# Patient Record
Sex: FEMALE | Race: WHITE | Employment: UNEMPLOYED | URBAN - METROPOLITAN AREA
[De-identification: names, ages, dates, MRNs, and addresses within clinical notes are randomized per-mention and may not be internally consistent; named-entity substitution may affect disease eponyms.]

---

## 2017-08-07 ENCOUNTER — HOSPITAL ENCOUNTER (INPATIENT)
Age: 52
LOS: 1 days | Discharge: HOME | DRG: 885 | End: 2017-08-08
Attending: EMERGENCY MEDICINE | Admitting: PSYCHIATRY & NEUROLOGY
Payer: MEDICARE

## 2017-08-07 ENCOUNTER — APPOINTMENT (OUTPATIENT)
Dept: GENERAL RADIOLOGY | Age: 52
DRG: 885 | End: 2017-08-07
Attending: PHYSICIAN ASSISTANT
Payer: MEDICARE

## 2017-08-07 DIAGNOSIS — R45.851 SUICIDAL IDEATION: Primary | ICD-10-CM

## 2017-08-07 DIAGNOSIS — F25.9 SCHIZOAFFECTIVE DISORDER, UNSPECIFIED TYPE (HCC): ICD-10-CM

## 2017-08-07 DIAGNOSIS — S63.502A WRIST SPRAIN, LEFT, INITIAL ENCOUNTER: ICD-10-CM

## 2017-08-07 PROBLEM — F20.9 SCHIZOPHRENIA (HCC): Status: ACTIVE | Noted: 2017-08-07

## 2017-08-07 LAB
ALBUMIN SERPL BCP-MCNC: 3.2 G/DL (ref 3.5–5)
ALBUMIN/GLOB SERPL: 0.9 {RATIO} (ref 1.1–2.2)
ALP SERPL-CCNC: 154 U/L (ref 45–117)
ALT SERPL-CCNC: 20 U/L (ref 12–78)
AMPHET UR QL SCN: NEGATIVE
ANION GAP BLD CALC-SCNC: 12 MMOL/L (ref 5–15)
AST SERPL W P-5'-P-CCNC: 17 U/L (ref 15–37)
BARBITURATES UR QL SCN: NEGATIVE
BASOPHILS # BLD AUTO: 0 K/UL (ref 0–0.1)
BASOPHILS # BLD: 0 % (ref 0–1)
BENZODIAZ UR QL: NEGATIVE
BILIRUB SERPL-MCNC: 0.2 MG/DL (ref 0.2–1)
BUN SERPL-MCNC: 11 MG/DL (ref 6–20)
BUN/CREAT SERPL: 14 (ref 12–20)
CALCIUM SERPL-MCNC: 8.8 MG/DL (ref 8.5–10.1)
CANNABINOIDS UR QL SCN: NEGATIVE
CHLORIDE SERPL-SCNC: 104 MMOL/L (ref 97–108)
CO2 SERPL-SCNC: 26 MMOL/L (ref 21–32)
COCAINE UR QL SCN: NEGATIVE
CREAT SERPL-MCNC: 0.79 MG/DL (ref 0.55–1.02)
DRUG SCRN COMMENT,DRGCM: NORMAL
EOSINOPHIL # BLD: 0.1 K/UL (ref 0–0.4)
EOSINOPHIL NFR BLD: 1 % (ref 0–7)
ERYTHROCYTE [DISTWIDTH] IN BLOOD BY AUTOMATED COUNT: 12.9 % (ref 11.5–14.5)
ETHANOL SERPL-MCNC: <10 MG/DL
GLOBULIN SER CALC-MCNC: 3.7 G/DL (ref 2–4)
GLUCOSE SERPL-MCNC: 132 MG/DL (ref 65–100)
HCG UR QL: NEGATIVE
HCT VFR BLD AUTO: 36.9 % (ref 35–47)
HGB BLD-MCNC: 13 G/DL (ref 11.5–16)
LYMPHOCYTES # BLD AUTO: 20 % (ref 12–49)
LYMPHOCYTES # BLD: 2.3 K/UL (ref 0.8–3.5)
MCH RBC QN AUTO: 30.8 PG (ref 26–34)
MCHC RBC AUTO-ENTMCNC: 35.2 G/DL (ref 30–36.5)
MCV RBC AUTO: 87.4 FL (ref 80–99)
METHADONE UR QL: NEGATIVE
MONOCYTES # BLD: 0.6 K/UL (ref 0–1)
MONOCYTES NFR BLD AUTO: 5 % (ref 5–13)
NEUTS SEG # BLD: 8.3 K/UL (ref 1.8–8)
NEUTS SEG NFR BLD AUTO: 74 % (ref 32–75)
OPIATES UR QL: NEGATIVE
PCP UR QL: NEGATIVE
PLATELET # BLD AUTO: 284 K/UL (ref 150–400)
POTASSIUM SERPL-SCNC: 3.3 MMOL/L (ref 3.5–5.1)
PROT SERPL-MCNC: 6.9 G/DL (ref 6.4–8.2)
RBC # BLD AUTO: 4.22 M/UL (ref 3.8–5.2)
SODIUM SERPL-SCNC: 142 MMOL/L (ref 136–145)
WBC # BLD AUTO: 11.3 K/UL (ref 3.6–11)

## 2017-08-07 PROCEDURE — 73110 X-RAY EXAM OF WRIST: CPT

## 2017-08-07 PROCEDURE — 80307 DRUG TEST PRSMV CHEM ANLYZR: CPT | Performed by: EMERGENCY MEDICINE

## 2017-08-07 PROCEDURE — 65220000003 HC RM SEMIPRIVATE PSYCH

## 2017-08-07 PROCEDURE — 74011250637 HC RX REV CODE- 250/637: Performed by: PHYSICIAN ASSISTANT

## 2017-08-07 PROCEDURE — 81025 URINE PREGNANCY TEST: CPT

## 2017-08-07 PROCEDURE — 36415 COLL VENOUS BLD VENIPUNCTURE: CPT | Performed by: EMERGENCY MEDICINE

## 2017-08-07 PROCEDURE — 74011250637 HC RX REV CODE- 250/637: Performed by: PSYCHIATRY & NEUROLOGY

## 2017-08-07 PROCEDURE — 80053 COMPREHEN METABOLIC PANEL: CPT | Performed by: EMERGENCY MEDICINE

## 2017-08-07 PROCEDURE — 90791 PSYCH DIAGNOSTIC EVALUATION: CPT

## 2017-08-07 PROCEDURE — 85025 COMPLETE CBC W/AUTO DIFF WBC: CPT | Performed by: EMERGENCY MEDICINE

## 2017-08-07 PROCEDURE — 99285 EMERGENCY DEPT VISIT HI MDM: CPT

## 2017-08-07 RX ORDER — BENZTROPINE MESYLATE 1 MG/ML
2 INJECTION INTRAMUSCULAR; INTRAVENOUS
Status: DISCONTINUED | OUTPATIENT
Start: 2017-08-07 | End: 2017-08-08 | Stop reason: HOSPADM

## 2017-08-07 RX ORDER — ADHESIVE BANDAGE
30 BANDAGE TOPICAL DAILY PRN
Status: DISCONTINUED | OUTPATIENT
Start: 2017-08-07 | End: 2017-08-08 | Stop reason: HOSPADM

## 2017-08-07 RX ORDER — IBUPROFEN 400 MG/1
400 TABLET ORAL
Status: DISCONTINUED | OUTPATIENT
Start: 2017-08-07 | End: 2017-08-08 | Stop reason: HOSPADM

## 2017-08-07 RX ORDER — BENZTROPINE MESYLATE 2 MG/1
2 TABLET ORAL
Status: DISCONTINUED | OUTPATIENT
Start: 2017-08-07 | End: 2017-08-08 | Stop reason: HOSPADM

## 2017-08-07 RX ORDER — LORAZEPAM 2 MG/ML
2 INJECTION INTRAMUSCULAR
Status: DISCONTINUED | OUTPATIENT
Start: 2017-08-07 | End: 2017-08-08 | Stop reason: HOSPADM

## 2017-08-07 RX ORDER — OLANZAPINE 5 MG/1
5 TABLET ORAL
Status: DISCONTINUED | OUTPATIENT
Start: 2017-08-07 | End: 2017-08-08 | Stop reason: HOSPADM

## 2017-08-07 RX ORDER — ZOLPIDEM TARTRATE 10 MG/1
10 TABLET ORAL
Status: DISCONTINUED | OUTPATIENT
Start: 2017-08-07 | End: 2017-08-08 | Stop reason: HOSPADM

## 2017-08-07 RX ORDER — ACETAMINOPHEN 325 MG/1
650 TABLET ORAL
Status: DISCONTINUED | OUTPATIENT
Start: 2017-08-07 | End: 2017-08-08 | Stop reason: HOSPADM

## 2017-08-07 RX ORDER — IBUPROFEN 200 MG
1 TABLET ORAL
Status: DISCONTINUED | OUTPATIENT
Start: 2017-08-07 | End: 2017-08-08 | Stop reason: HOSPADM

## 2017-08-07 RX ORDER — POTASSIUM CHLORIDE 750 MG/1
20 TABLET, FILM COATED, EXTENDED RELEASE ORAL
Status: COMPLETED | OUTPATIENT
Start: 2017-08-07 | End: 2017-08-07

## 2017-08-07 RX ORDER — LORAZEPAM 1 MG/1
1 TABLET ORAL
Status: DISCONTINUED | OUTPATIENT
Start: 2017-08-07 | End: 2017-08-08 | Stop reason: HOSPADM

## 2017-08-07 RX ADMIN — LORAZEPAM 1 MG: 1 TABLET ORAL at 22:27

## 2017-08-07 RX ADMIN — IBUPROFEN 400 MG: 400 TABLET, FILM COATED ORAL at 21:49

## 2017-08-07 RX ADMIN — POTASSIUM CHLORIDE 20 MEQ: 750 TABLET, FILM COATED, EXTENDED RELEASE ORAL at 18:30

## 2017-08-07 RX ADMIN — OLANZAPINE 5 MG: 5 TABLET, FILM COATED ORAL at 22:27
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
## 2017-08-07 NOTE — IP AVS SNAPSHOT
[facility] 
 
 
 [address] Patient: Laura High MRN: [id number] :1965 You are allergic to the following Allergen Reactions Codeine Swelling Penicillins Swelling Flexeril (Cyclobenzaprine) Other (comments)  
 numbness Mushroom Combination No.1 Nausea and Vomiting Mustard Diarrhea Nausea and Vomiting Neurontin (Gabapentin) Other (comments) \"I get real sleepy and I fall\" Peppermint Diarrhea Nausea and Vomiting Pt states \"pepper\" Trazodone Other (comments) \"I get sleepy and I fall\" Recent Documentation Height Weight BMI OB Status Smoking Status 1.549 m 102.1 kg 42.51 kg/m2 Premenopausal Former Smoker Unresulted Labs Order Current Status CULTURE, MRSA In process Emergency Contacts Name Discharge Info Relation Home Work Mobile Brandi Baird CAREGIVER [3] Friend [5] [phone] About your hospitalization You were admitted on:  2017 You last received care in the:  [address] [facility] [address] [address] You were discharged on:  2017 Unit phone number:  [phone] Why you were hospitalized Your primary diagnosis was:  Schizoaffective Disorder, Mixed Type (Hcc) Your diagnoses also included:  Malingering, Essential Hypertension, Acquired Hypothyroidism, Morbid Obesity (Hcc), Borderline Personality Disorder Providers Seen During Your Hospitalizations Provider Role Specialty Primary office phone Bart Simental MD Attending Provider Emergency Medicine [phone] Alexandre Ernandez MD Attending Provider Psychiatry [phone] Yovani James MD Attending Provider Psychiatry [phone] Your Primary Care Physician (PCP) Primary Care Physician Office Phone Office Fax Florinda Record [phone] Follow-up Information Follow up With Details Comments Contact Info Elsie Ravi MD   [address] [facility] 
[phone] [facility] On 9/13/2017 4:00 PM for medication management  [address] [facility] [address] 
  
Dr. Rashaad Jenkins- psychiatry appointment on September 13th at 4:00PM  
 [facility] On 8/11/2017 2:00 PM for case management  [address] [facility] [address] [facility] manager will come to [facility]. Current Discharge Medication List  
  
CONTINUE these medications which have NOT CHANGED Dose & Instructions Dispensing Information Comments Morning Noon Evening Bedtime  
 albuterol 90 mcg/actuation inhaler Commonly known as:  PROVENTIL HFA, VENTOLIN HFA, PROAIR HFA Your last dose was: Your next dose is:    
   
   
 Dose:  1-2 Puff Take 1-2 Puffs by inhalation every four (4) hours as needed for Wheezing. Refills:  0  
     
   
   
   
  
 allopurinol 100 mg tablet Commonly known as:  Carolynne Flavors Your last dose was: Your next dose is:    
   
   
 Dose:  200 mg Take 200 mg by mouth daily. Refills:  0  
     
   
   
   
  
 atorvastatin 40 mg tablet Commonly known as:  LIPITOR Your last dose was: Your next dose is:    
   
   
 Dose:  40 mg Take 40 mg by mouth nightly. Refills:  0 FLUoxetine 20 mg capsule Commonly known as:  PROzac Your last dose was: Your next dose is:    
   
   
 Dose:  20 mg Take 20 mg by mouth daily. Refills:  0 KEPPRA 750 mg tablet Generic drug:  levETIRAcetam  
   
Your last dose was: Your next dose is:    
   
   
 Dose:  750 mg Take 750 mg by mouth two (2) times a day. Refills:  0  
     
   
   
   
  
 levothyroxine 50 mcg tablet Commonly known as:  SYNTHROID Your last dose was: Your next dose is:    
   
   
 Dose:  50 mcg Take 1 Tab by mouth Daily (before breakfast). Quantity:  30 Tab Refills:  0  
     
   
   
   
  
 omeprazole 20 mg capsule Commonly known as:  PRILOSEC Your last dose was: Your next dose is:    
   
   
 Dose:  20 mg Take 20 mg by mouth nightly. Refills:  0  
     
   
   
   
  
 oxybutynin chloride XL 5 mg CR tablet Commonly known as:  DITROPAN XL Your last dose was: Your next dose is:    
   
   
 Dose:  5 mg Take 5 mg by mouth two (2) times a day. Refills:  0 phenytoin  mg ER capsule Commonly known as:  DILANTIN ER Your last dose was: Your next dose is:    
   
   
 Dose:  200 mg Take 200 mg by mouth two (2) times a day. Refills:  0  
     
   
   
   
  
 prazosin 1 mg capsule Commonly known as:  MINIPRESS Your last dose was: Your next dose is:    
   
   
 Dose:  2 mg Take 2 mg by mouth nightly. Refills:  0  
     
   
   
   
  
 * QUEtiapine 100 mg tablet Commonly known as:  SEROquel Your last dose was: Your next dose is:    
   
   
 Dose:  100 mg Take 100 mg by mouth every morning. Refills:  0  
     
   
   
   
  
 * QUEtiapine 100 mg tablet Commonly known as:  SEROquel Your last dose was: Your next dose is:    
   
   
 Dose:  200 mg Take 200 mg by mouth nightly. Refills:  0  
     
   
   
   
  
 * Notice: This list has 2 medication(s) that are the same as other medications prescribed for you. Read the directions carefully, and ask your doctor or other care provider to review them with you. Discharge Instructions DISCHARGE SUMMARY from Nurse The following personal items are in your possession at time of discharge: 
Visual Aid: None Clothing: Lorraine Blanca Other Valuables: Cigarettes, Lighter/matches, Wallet, Other (comment) (book, drinking bottle, and bra) PATIENT INSTRUCTIONS: 
What to do at Home: 
Recommended activity: Activity as tolerated *  Please give a list of your current medications to your Primary Care Provider. *  Please update this list whenever your medications are discontinued, doses are 
    changed, or new medications (including over-the-counter products) are added. *  Please carry medication information at all times in case of emergency situations. These are general instructions for a healthy lifestyle: No smoking/ No tobacco products/ Avoid exposure to second hand smoke Surgeon General's Warning:  Quitting smoking now greatly reduces serious risk to your health. Obesity, smoking, and sedentary lifestyle greatly increases your risk for illness A healthy diet, regular physical exercise & weight monitoring are important for maintaining a healthy lifestyle The discharge information has been reviewed with the patient. The patient verbalized understanding. Discharge medications reviewed with the patient and appropriate educational materials and side effects teaching were provided. .If I feel I am at risk of hurting myself or others, I will call the crisis office and speak with a crisis worker who will assist me during my crisis. [facility]  [phone] [facility] [phone] [place] [address]  [phone] [employer] 16-   [phone] [employer]-  [phone] 9 [facility]  [phone] [address] [place] [address]  [phone] Discharge Orders None [facility] Announcement We are excited to announce that we are making your provider's discharge notes available to you in [employer]. You will see these notes when they are completed and signed by the physician that discharged you from your recent hospital stay.   If you have any questions or concerns about any information you see in [employer], please call the Health Information Department where you were seen or reach out to your Primary Care Provider for more information about your plan of care. Introducing [facility]! Franny Toribio introduces [employer] patient portal. Now you can access parts of your medical record, email your doctor's office, and request medication refills online. 1. In your internet browser, go to [email or web address] 2. Click on the First Time User? Click Here link in the Sign In box. You will see the New Member Sign Up page. 3. Enter your [employer] Access Code exactly as it appears below. You will not need to use this code after youve completed the sign-up process. If you do not sign up before the expiration date, you must request a new code. · [employer] Access Code: [email or web address] Expires: 11/6/2017  1:07 PM 
 
4. Enter the last four digits of your Social Security Number (xxxx) and Date of Birth (mm/dd/yyyy) as indicated and click Submit. You will be taken to the next sign-up page. 5. Create a [employer] ID. This will be your [employer] login ID and cannot be changed, so think of one that is secure and easy to remember. 6. Create a [employer] password. You can change your password at any time. 7. Enter your Password Reset Question and Answer. This can be used at a later time if you forget your password. 8. Enter your e-mail address. You will receive e-mail notification when new information is available in [address] 9. Click Sign Up. You can now view and download portions of your medical record. 10. Click the Download Summary menu link to download a portable copy of your medical information. If you have questions, please visit the Frequently Asked Questions section of the [employer] website. Remember, [employer] is NOT to be used for urgent needs. For medical emergencies, dial 911. Now available from your iPhone and Android! General Information Please provide this summary of care documentation to your next provider. Patient Signature:  ____________________________________________________________ Date:  ____________________________________________________________  
  
Kym Oka Provider Signature:  ____________________________________________________________ Date:  ____________________________________________________________

## 2017-08-07 NOTE — ED NOTES
Bedside and Verbal shift change report given to Mickey Baron RN (oncoming nurse) by Chaparrita Cook RN (offgoing nurse). Report included the following information SBAR, ED Summary, MAR and Recent Results. Assumed pt care at this time. Pt noted to be resting comfortably. Pt updated on plan of care, has no questions or concerns at this time.
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
## 2017-08-07 NOTE — IP AVS SNAPSHOT
[address] [facility], [place] 
 
 
 [address] Patient: Khadijah Duran MRN: [id number] :1965 Current Discharge Medication List  
  
CONTINUE these medications which have NOT CHANGED Dose & Instructions Dispensing Information Comments Morning Noon Evening Bedtime  
 albuterol 90 mcg/actuation inhaler Commonly known as:  PROVENTIL HFA, VENTOLIN HFA, PROAIR HFA Your last dose was: Your next dose is:    
   
   
 Dose:  1-2 Puff Take 1-2 Puffs by inhalation every four (4) hours as needed for Wheezing. Refills:  0  
     
   
   
   
  
 allopurinol 100 mg tablet Commonly known as:  Amee Fulling Your last dose was: Your next dose is:    
   
   
 Dose:  200 mg Take 200 mg by mouth daily. Refills:  0  
     
   
   
   
  
 atorvastatin 40 mg tablet Commonly known as:  LIPITOR Your last dose was: Your next dose is:    
   
   
 Dose:  40 mg Take 40 mg by mouth nightly. Refills:  0 FLUoxetine 20 mg capsule Commonly known as:  PROzac Your last dose was: Your next dose is:    
   
   
 Dose:  20 mg Take 20 mg by mouth daily. Refills:  0 KEPPRA 750 mg tablet Generic drug:  levETIRAcetam  
   
Your last dose was: Your next dose is:    
   
   
 Dose:  750 mg Take 750 mg by mouth two (2) times a day. Refills:  0  
     
   
   
   
  
 levothyroxine 50 mcg tablet Commonly known as:  SYNTHROID Your last dose was: Your next dose is:    
   
   
 Dose:  50 mcg Take 1 Tab by mouth Daily (before breakfast). Quantity:  30 Tab Refills:  0  
     
   
   
   
  
 omeprazole 20 mg capsule Commonly known as:  PRILOSEC Your last dose was: Your next dose is:    
   
   
 Dose:  20 mg Take 20 mg by mouth nightly. Refills:  0 oxybutynin chloride XL 5 mg CR tablet Commonly known as:  DITROPAN XL Your last dose was: Your next dose is:    
   
   
 Dose:  5 mg Take 5 mg by mouth two (2) times a day. Refills:  0 phenytoin  mg ER capsule Commonly known as:  DILANTIN ER Your last dose was: Your next dose is:    
   
   
 Dose:  200 mg Take 200 mg by mouth two (2) times a day. Refills:  0  
     
   
   
   
  
 prazosin 1 mg capsule Commonly known as:  MINIPRESS Your last dose was: Your next dose is:    
   
   
 Dose:  2 mg Take 2 mg by mouth nightly. Refills:  0  
     
   
   
   
  
 * QUEtiapine 100 mg tablet Commonly known as:  SEROquel Your last dose was: Your next dose is:    
   
   
 Dose:  100 mg Take 100 mg by mouth every morning. Refills:  0  
     
   
   
   
  
 * QUEtiapine 100 mg tablet Commonly known as:  SEROquel Your last dose was: Your next dose is:    
   
   
 Dose:  200 mg Take 200 mg by mouth nightly. Refills:  0  
     
   
   
   
  
 * Notice: This list has 2 medication(s) that are the same as other medications prescribed for you. Read the directions carefully, and ask your doctor or other care provider to review them with you.

## 2017-08-07 NOTE — ROUTINE PROCESS
TRANSFER - OUT REPORT:    Verbal report given to Igor RN(name) on [facility]  being transferred to U(unit) for routine progression of care       Report consisted of patients Situation, Background, Assessment and   Recommendations(SBAR). Information from the following report(s) SBAR, ED Summary, [facility] and Recent Results was reviewed with the receiving nurse. Lines:       Opportunity for questions and clarification was provided.       Patient transported with:  Rasheed Hawk

## 2017-08-08 VITALS
TEMPERATURE: 96.9 F | HEART RATE: 78 BPM | RESPIRATION RATE: 16 BRPM | WEIGHT: 225 LBS | DIASTOLIC BLOOD PRESSURE: 76 MMHG | HEIGHT: 61 IN | BODY MASS INDEX: 42.48 KG/M2 | OXYGEN SATURATION: 96 % | SYSTOLIC BLOOD PRESSURE: 109 MMHG

## 2017-08-08 PROBLEM — Z76.5 MALINGERING: Status: ACTIVE | Noted: 2017-08-08

## 2017-08-08 PROBLEM — I10 ESSENTIAL HYPERTENSION: Status: ACTIVE | Noted: 2017-08-08

## 2017-08-08 PROBLEM — E03.9 ACQUIRED HYPOTHYROIDISM: Status: ACTIVE | Noted: 2017-08-08

## 2017-08-08 LAB
CHOLEST SERPL-MCNC: 217 MG/DL
GLUCOSE P FAST SERPL-MCNC: 112 MG/DL (ref 65–100)
HDLC SERPL-MCNC: 55 MG/DL
HDLC SERPL: 3.9 {RATIO} (ref 0–5)
LDLC SERPL CALC-MCNC: 121.2 MG/DL (ref 0–100)
LIPID PROFILE,FLP: ABNORMAL
TRIGL SERPL-MCNC: 204 MG/DL (ref ?–150)
TSH SERPL DL<=0.05 MIU/L-ACNC: 0.01 UIU/ML (ref 0.36–3.74)
VLDLC SERPL CALC-MCNC: 40.8 MG/DL

## 2017-08-08 PROCEDURE — 80061 LIPID PANEL: CPT | Performed by: PSYCHIATRY & NEUROLOGY

## 2017-08-08 PROCEDURE — 74011250637 HC RX REV CODE- 250/637: Performed by: PSYCHIATRY & NEUROLOGY

## 2017-08-08 PROCEDURE — 82947 ASSAY GLUCOSE BLOOD QUANT: CPT | Performed by: PSYCHIATRY & NEUROLOGY

## 2017-08-08 PROCEDURE — 84443 ASSAY THYROID STIM HORMONE: CPT | Performed by: PSYCHIATRY & NEUROLOGY

## 2017-08-08 PROCEDURE — 36415 COLL VENOUS BLD VENIPUNCTURE: CPT | Performed by: PSYCHIATRY & NEUROLOGY

## 2017-08-08 RX ORDER — ALBUTEROL SULFATE 90 UG/1
1-2 AEROSOL, METERED RESPIRATORY (INHALATION)
Status: DISCONTINUED | OUTPATIENT
Start: 2017-08-08 | End: 2017-08-08 | Stop reason: HOSPADM

## 2017-08-08 RX ORDER — QUETIAPINE FUMARATE 100 MG/1
100 TABLET, FILM COATED ORAL
COMMUNITY

## 2017-08-08 RX ORDER — PHENYTOIN SODIUM 100 MG/1
200 CAPSULE, EXTENDED RELEASE ORAL 2 TIMES DAILY
Status: DISCONTINUED | OUTPATIENT
Start: 2017-08-08 | End: 2017-08-08 | Stop reason: HOSPADM

## 2017-08-08 RX ORDER — OMEPRAZOLE 20 MG/1
20 CAPSULE, DELAYED RELEASE ORAL
COMMUNITY

## 2017-08-08 RX ORDER — ATORVASTATIN CALCIUM 40 MG/1
40 TABLET, FILM COATED ORAL
COMMUNITY

## 2017-08-08 RX ORDER — PRAZOSIN HYDROCHLORIDE 1 MG/1
2 CAPSULE ORAL
COMMUNITY

## 2017-08-08 RX ORDER — FLUOXETINE HYDROCHLORIDE 20 MG/1
20 CAPSULE ORAL DAILY
COMMUNITY

## 2017-08-08 RX ORDER — OXYBUTYNIN CHLORIDE 5 MG/1
5 TABLET, EXTENDED RELEASE ORAL 2 TIMES DAILY
Status: DISCONTINUED | OUTPATIENT
Start: 2017-08-08 | End: 2017-08-08 | Stop reason: HOSPADM

## 2017-08-08 RX ORDER — ALLOPURINOL 100 MG/1
200 TABLET ORAL DAILY
COMMUNITY

## 2017-08-08 RX ORDER — LEVOTHYROXINE SODIUM 50 UG/1
50 TABLET ORAL
Status: DISCONTINUED | OUTPATIENT
Start: 2017-08-09 | End: 2017-08-08 | Stop reason: HOSPADM

## 2017-08-08 RX ORDER — FLUOXETINE HYDROCHLORIDE 20 MG/1
20 CAPSULE ORAL DAILY
Status: DISCONTINUED | OUTPATIENT
Start: 2017-08-08 | End: 2017-08-08 | Stop reason: HOSPADM

## 2017-08-08 RX ORDER — PRAZOSIN HYDROCHLORIDE 1 MG/1
2 CAPSULE ORAL
Status: DISCONTINUED | OUTPATIENT
Start: 2017-08-08 | End: 2017-08-08 | Stop reason: HOSPADM

## 2017-08-08 RX ORDER — OXYBUTYNIN CHLORIDE 5 MG/1
5 TABLET, EXTENDED RELEASE ORAL 2 TIMES DAILY
COMMUNITY

## 2017-08-08 RX ORDER — ALBUTEROL SULFATE 90 UG/1
1-2 AEROSOL, METERED RESPIRATORY (INHALATION)
COMMUNITY

## 2017-08-08 RX ORDER — ATORVASTATIN CALCIUM 40 MG/1
40 TABLET, FILM COATED ORAL
Status: DISCONTINUED | OUTPATIENT
Start: 2017-08-08 | End: 2017-08-08 | Stop reason: HOSPADM

## 2017-08-08 RX ORDER — QUETIAPINE FUMARATE 100 MG/1
100 TABLET, FILM COATED ORAL
Status: DISCONTINUED | OUTPATIENT
Start: 2017-08-08 | End: 2017-08-08 | Stop reason: HOSPADM

## 2017-08-08 RX ORDER — ALLOPURINOL 100 MG/1
200 TABLET ORAL DAILY
Status: DISCONTINUED | OUTPATIENT
Start: 2017-08-08 | End: 2017-08-08 | Stop reason: HOSPADM

## 2017-08-08 RX ORDER — QUETIAPINE FUMARATE 200 MG/1
200 TABLET, FILM COATED ORAL
Status: DISCONTINUED | OUTPATIENT
Start: 2017-08-08 | End: 2017-08-08 | Stop reason: HOSPADM

## 2017-08-08 RX ORDER — PANTOPRAZOLE SODIUM 40 MG/1
40 TABLET, DELAYED RELEASE ORAL
Status: DISCONTINUED | OUTPATIENT
Start: 2017-08-08 | End: 2017-08-08 | Stop reason: HOSPADM

## 2017-08-08 RX ORDER — QUETIAPINE FUMARATE 100 MG/1
200 TABLET, FILM COATED ORAL
COMMUNITY

## 2017-08-08 RX ADMIN — QUETIAPINE FUMARATE 100 MG: 100 TABLET, FILM COATED ORAL at 12:53

## 2017-08-08 RX ADMIN — ALLOPURINOL 200 MG: 100 TABLET ORAL at 12:53

## 2017-08-08 RX ADMIN — FLUOXETINE 20 MG: 20 CAPSULE ORAL at 12:53

## 2017-08-08 NOTE — DISCHARGE INSTRUCTIONS
DISCHARGE SUMMARY from Nurse    The following personal items are in your possession at time of discharge:  Visual Aid: None  Clothing: Hat, Pants, Shirt  Other Valuables: Cigarettes, Lighter/matches, Wallet, Other (comment) (book, drinking bottle, and bra)  PATIENT INSTRUCTIONS:  What to do at Home:  Recommended activity: Activity as tolerated  *  Please give a list of your current medications to your Primary Care Provider. *  Please update this list whenever your medications are discontinued, doses are      changed, or new medications (including over-the-counter products) are added. *  Please carry medication information at all times in case of emergency situations. These are general instructions for a healthy lifestyle:    No smoking/ No tobacco products/ Avoid exposure to second hand smoke    Surgeon General's Warning:  Quitting smoking now greatly reduces serious risk to your health. Obesity, smoking, and sedentary lifestyle greatly increases your risk for illness    A healthy diet, regular physical exercise & weight monitoring are important for maintaining a healthy lifestyle  The discharge information has been reviewed with the patient. The patient verbalized understanding. Discharge medications reviewed with the patient and appropriate educational materials and side effects teaching were provided. .If I feel I am at risk of hurting myself or others, I will call the crisis office and speak with a crisis worker who will assist me during my crisis. Nataliia Moe [address]  [phone]  [facility] [place] [address] [phone] [address] [facility]  [place] [address] [place] [address]-  [phone]
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
## 2017-08-08 NOTE — BH NOTES
Admission Note:    Pt arrived in wheelchair from [facility] ED under voluntary status. Pt admitted for wrist pain and auditory hallucinations. Pt stated the voices are telling her to \"kill the nurse\" while in ED. Pt's x-ray of wrist was negative. ED wrapped wrist for patient comfort. When instructed to remove wrist wrap on  acute unit (for safety reasons), pt became very angry and began threatening staff stating \"I was in the [employer], I will take all y'all. \" And \"you are going to have to make me take it off, bring it on. Pt then stated \"if y'all have to remove it from my wrist then I don't want to be here. \" Dr. Alejandra Angela notified and Crisis was contacted for evaluation. \" Pt denies SI at moment but says she had them before she \"got here\" pt reports HI. Pt states she is depressed and angry. Med Hx of HTN, asthma, COPD, and non-insulin diabetes. UDS and BAL negative. Pain of 9/10 on numeric scale. Vitals WDL. K+ 3.2 (recieved 20 meq potassium in ED). Pt was redirected to room and has been responding and cursing in room. Will continue to monitor q 15 for safety.

## 2017-08-08 NOTE — H&P
INITIAL PSYCHIATRIC EVALUATION & DISCHARGE SUMMARY           IDENTIFICATION:    Patient Name  Betsy Hawk   Date of Birth 1965   [facility] [id number]   Medical Record Number  [id number]      Age  50 y.o. PCP Brandon Aguilar MD   Admit date:  8/7/2017    Room Number  314/01  @ [facility]   Date of Service  8/8/2017            HISTORY         TYPE OF DISCHARGE: REGULAR       CONDITION AT DISCHARGE: good       REASON FOR HOSPITALIZATION:  CC: \"depressed, agitated\". Pt admitted under a voluntary basis for depression with suicidal ideations and reports of command aud ángel.    HISTORY OF PRESENT ILLNESS:    The patient, Betsy Hawk, is a 50 y.o. WHITE OR  female with a past psychiatric history significant for malingering/ factitious dis, Borderline PD, and likely schizoaffective dis, who presents at this time with complaints of (and/or evidence of) the following emotional symptoms: depression and suicidal thoughts/threats. Additional symptomatology include c/o command aud ángel and agitation. The above symptoms have been present for a day. These symptoms are of severe severity per pt. These symptoms are intermittent/ fleeting in nature. The patient's condition has been precipitated by psychosocial stressors (conflicts with peers, chronic group home disatisfaction ). Patient's condition made worse by acting out behaviors. Pt well known to be extremely gamy and manipulative in nature. She has never been noticed to be acutely psychotic in nature while on the unit under my care. She is not at all internally preoc at this time. At time of discharge, Betsy Hawk is without significant problems of depression, psychosis or janice. Patient free of suicidal and homicidal ideations (appears to be at very low risk of suicide or homicide) and reports many positive predictive factors in terms of not attempting suicide or homicide.  Overall presentation at time of discharge is most consistent with the diagnosis of malingering. Patient has maximized benefit to be derived from acute inpatient psychiatric treatment. All members of the treatment team concur with each other in regards to plans for discharge today . Patient aware and in agreement with discharge and discharge plan. ALLERGIES:   Allergies   Allergen Reactions    Codeine Swelling    Penicillins Swelling    Flexeril [Cyclobenzaprine] Other (comments)     numbness    Mushroom Combination No.1 Nausea and Vomiting    Mustard Diarrhea and Nausea and Vomiting    Neurontin [Gabapentin] Other (comments)     \"I get real sleepy and I fall\"    Peppermint Diarrhea and Nausea and Vomiting     Pt states \"pepper\"    Trazodone Other (comments)     \"I get sleepy and I fall\"      MEDICATIONS PRIOR TO ADMISSION:   Prescriptions Prior to Admission   Medication Sig    oxybutynin chloride XL (DITROPAN XL) 5 mg CR tablet Take 5 mg by mouth two (2) times a day.  omeprazole (PRILOSEC) 20 mg capsule Take 20 mg by mouth nightly.  allopurinol (ZYLOPRIM) 100 mg tablet Take 200 mg by mouth daily.  atorvastatin (LIPITOR) 40 mg tablet Take 40 mg by mouth nightly.  prazosin (MINIPRESS) 1 mg capsule Take 2 mg by mouth nightly.  albuterol (PROVENTIL HFA, VENTOLIN HFA, PROAIR HFA) 90 mcg/actuation inhaler Take 1-2 Puffs by inhalation every four (4) hours as needed for Wheezing.  FLUoxetine (PROZAC) 20 mg capsule Take 20 mg by mouth daily.  QUEtiapine (SEROQUEL) 100 mg tablet Take 50 mg by mouth every morning.  QUEtiapine (SEROQUEL) 100 mg tablet Take 200 mg by mouth nightly.  phenytoin ER (DILANTIN ER) 100 mg ER capsule Take 200 mg by mouth two (2) times a day.  levETIRAcetam (KEPPRA) 750 mg tablet Take 750 mg by mouth two (2) times a day.  levothyroxine (SYNTHROID) 50 mcg tablet Take 1 Tab by mouth Daily (before breakfast).       PAST MEDICAL HISTORY:   Past Medical History:   Diagnosis Date    Acquired hypothyroidism 8/8/2017    Anxiety disorder     Asthma     COPD     uses 2 LPM O2 at home    Depression     Depression     Diabetes ([facility] [address]     Homicide attempt     Hypertension     Hypothyroidism     hypothyroid    Mood disorder (HCC)     Psychotic disorder     Seizures ([facility] [address]     Suicidal behavior     schizoaffective    Suicidal thoughts      Past Surgical History:   Procedure Laterality Date    HX APPENDECTOMY      HX  SECTION      HX CHOLECYSTECTOMY      HX HEENT      throat surgery (after a dog bite)      SOCIAL HISTORY:    Social History     Social History    Marital status: SINGLE     Spouse name: N/A    Number of children: N/A    Years of education: N/A     Occupational History    Not on file. Social History Main Topics    Smoking status: Former Smoker    Smokeless tobacco: Never Used    Alcohol use 3.0 oz/week     6 Cans of beer per week    Drug use: No    Sexual activity: No     Other Topics Concern    Not on file     Social History Narrative    The patient is single. The patient lives with a caregiver in a group home. Pt has had multiple group home placements, chronically dissatisfied with placements. The patient has one child but, gave this child up for adoption at birth . The patient does not have legal issues pending. The patient's source of income comes from social security. Jainism and cultural practices have been noted and include: Restorationism. 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 The patient has not been a victim of sexual/physical abuse. The highest grade achieved is some college. FAMILY HISTORY: History reviewed. No pertinent family history. History reviewed. No pertinent family history.     REVIEW OF SYSTEMS:   Psychological ROS: positive for - irritability  negative for - suicidal ideation  Pertinent items are noted in the History of Present Illness. All other Systems reviewed and are considered negative. MENTAL STATUS EXAM & VITALS     MENTAL STATUS EXAM (MSE):    MSE FINDINGS ARE WITHIN NORMAL LIMITS (WNL) UNLESS OTHERWISE STATED BELOW. ( ALL OF THE BELOW CATEGORIES OF THE MSE HAVE BEEN REVIEWED (reviewed 8/8/2017) AND UPDATED AS DEEMED APPROPRIATE )  General Presentation casually dressed, disheveled and overweight, unreliable   Orientation oriented to time, place and person   Vital Signs  See below (reviewed 8/8/2017); Vital Signs (BP, Pulse, & Temp) are within normal limits if not listed below.    Gait and Station Stable/steady, no ataxia   Musculoskeletal System No extrapyramidal symptoms (EPS); no abnormal muscular movements or Tardive Dyskinesia (TD); muscle strength and tone are within normal limits   Language No aphasia or dysarthria   Speech:  normal pitch and normal volume   Thought Processes logical; normal rate of thoughts; good abstract reasoning/computation   Thought Associations goal directed   Thought Content free of delusions, free of hallucinations and not internally preoccupied, claimed aud ángel on adm   Suicidal Ideations no plan , no intention, none and contracts for safety   Homicidal Ideations none   Mood:  irritable   Affect:  euthymic and gamy   Memory recent  intact   Memory remote:  intact   Concentration/Attention:  intact   Fund of Knowledge average   Insight:  fair   Reliability poor   Judgment:  limited          VITALS:     Patient Vitals for the past 24 hrs:   Temp Pulse Resp BP SpO2   08/08/17 0544 - 78 16 109/76 -   08/07/17 2012 96.9 °F (36.1 °C) 76 20 119/85 -   08/07/17 1609 98.6 °F (37 °C) 90 16 (!) 136/96 96 %     Wt Readings from Last 3 Encounters:   08/07/17 102.1 kg (225 lb)   07/29/15 99.8 kg (220 lb)   09/08/14 97.5 kg (215 lb)     Temp Readings from Last 3 Encounters:   07/29/15 98.6 °F (37 °C)   09/08/14 98.6 °F (37 °C)   02/06/13 98 °F (36.7 °C)     BP Readings from Last 3 Encounters: 08/08/17 109/76   07/29/15 (!) 129/93   09/08/14 145/43     Pulse Readings from Last 3 Encounters:   08/08/17 78   07/29/15 98   09/08/14 75            DATA     LABORATORY DATA:  Labs Reviewed   CBC WITH AUTOMATED DIFF - Abnormal; Notable for the following:        Result Value    WBC 11.3 (*)     ABS. NEUTROPHILS 8.3 (*)     All other components within normal limits   METABOLIC PANEL, COMPREHENSIVE - Abnormal; Notable for the following:     Potassium 3.3 (*)     Glucose 132 (*)     Alk. phosphatase 154 (*)     Albumin 3.2 (*)     A-G Ratio 0.9 (*)     All other components within normal limits   GLUCOSE, FASTING - Abnormal; Notable for the following:     Glucose 112 (*)     All other components within normal limits   TSH 3RD GENERATION - Abnormal; Notable for the following:     TSH 0.01 (*)     All other components within normal limits   LIPID PANEL - Abnormal; Notable for the following:     Cholesterol, total 217 (*)     Triglyceride 204 (*)     LDL, calculated 121.2 (*)     All other components within normal limits   CULTURE, MRSA   ETHYL ALCOHOL   DRUG SCREEN, URINE   URINALYSIS W/ REFLEX CULTURE   HCG URINE, QL. - POC     Admission on 08/07/2017   Component Date Value Ref Range Status    WBC 08/07/2017 11.3* 3.6 - 11.0 K/uL Final    RBC 08/07/2017 4.22  3.80 - 5.20 M/uL Final    HGB 08/07/2017 13.0  11.5 - 16.0 g/dL Final    HCT 08/07/2017 36.9  35.0 - 47.0 % Final    MCV 08/07/2017 87.4  80.0 - 99.0 FL Final    MCH 08/07/2017 30.8  26.0 - 34.0 PG Final    MCHC 08/07/2017 35.2  30.0 - 36.5 g/dL Final    RDW 08/07/2017 12.9  11.5 - 14.5 % Final    PLATELET 11/27/8944 773  150 - 400 K/uL Final    NEUTROPHILS 08/07/2017 74  32 - 75 % Final    LYMPHOCYTES 08/07/2017 20  12 - 49 % Final    MONOCYTES 08/07/2017 5  5 - 13 % Final    EOSINOPHILS 08/07/2017 1  0 - 7 % Final    BASOPHILS 08/07/2017 0  0 - 1 % Final    ABS. NEUTROPHILS 08/07/2017 8.3* 1.8 - 8.0 K/UL Final    ABS.  LYMPHOCYTES 08/07/2017 2.3 0.8 - 3.5 K/UL Final    ABS. MONOCYTES 08/07/2017 0.6  0.0 - 1.0 K/UL Final    ABS. EOSINOPHILS 08/07/2017 0.1  0.0 - 0.4 K/UL Final    ABS. BASOPHILS 08/07/2017 0.0  0.0 - 0.1 K/UL Final    Sodium 08/07/2017 142  136 - 145 mmol/L Final    Potassium 08/07/2017 3.3* 3.5 - 5.1 mmol/L Final    Chloride 08/07/2017 104  97 - 108 mmol/L Final    CO2 08/07/2017 26  21 - 32 mmol/L Final    Anion gap 08/07/2017 12  5 - 15 mmol/L Final    Glucose 08/07/2017 132* 65 - 100 mg/dL Final    BUN 08/07/2017 11  6 - 20 MG/DL Final    Creatinine 08/07/2017 0.79  0.55 - 1.02 MG/DL Final    BUN/Creatinine ratio 08/07/2017 14  12 - 20   Final    GFR est AA 08/07/2017 >60  >60 ml/min/1.73m2 Final    GFR est non-AA 08/07/2017 >60  >60 ml/min/1.73m2 Final    Calcium 08/07/2017 8.8  8.5 - 10.1 MG/DL Final    Bilirubin, total 08/07/2017 0.2  0.2 - 1.0 MG/DL Final    ALT (SGPT) 08/07/2017 20  12 - 78 U/L Final    AST (SGOT) 08/07/2017 17  15 - 37 U/L Final    Alk.  phosphatase 08/07/2017 154* 45 - 117 U/L Final    Protein, total 08/07/2017 6.9  6.4 - 8.2 g/dL Final    Albumin 08/07/2017 3.2* 3.5 - 5.0 g/dL Final    Globulin 08/07/2017 3.7  2.0 - 4.0 g/dL Final    A-G Ratio 08/07/2017 0.9* 1.1 - 2.2   Final    ALCOHOL(ETHYL),SERUM 08/07/2017 <10  <10 MG/DL Final    AMPHETAMINES 08/07/2017 NEGATIVE   NEG   Final    BARBITURATES 08/07/2017 NEGATIVE   NEG   Final    BENZODIAZEPINE 08/07/2017 NEGATIVE   NEG   Final    COCAINE 08/07/2017 NEGATIVE   NEG   Final    METHADONE 08/07/2017 NEGATIVE   NEG   Final    OPIATES 08/07/2017 NEGATIVE   NEG   Final    PCP(PHENCYCLIDINE) 08/07/2017 NEGATIVE   NEG   Final    THC (TH-CANNABINOL) 08/07/2017 NEGATIVE   NEG   Final    Drug screen comment 08/07/2017 (NOTE)   Final    Pregnancy test,urine (POC) 08/07/2017 NEGATIVE   NEG   Final    Glucose 08/08/2017 112* 65 - 100 MG/DL Final    TSH 08/08/2017 0.01* 0.36 - 3.74 uIU/mL Final    LIPID PROFILE 08/08/2017        Final    Cholesterol, total 08/08/2017 217* <200 MG/DL Final    Triglyceride 08/08/2017 204* <150 MG/DL Final    HDL Cholesterol 08/08/2017 55  MG/DL Final    LDL, calculated 08/08/2017 121.2* 0 - 100 MG/DL Final    VLDL, calculated 08/08/2017 40.8  MG/DL Final    CHOL/HDL Ratio 08/08/2017 3.9  0 - 5.0   Final        RADIOLOGY REPORTS:    Results from Hospital Encounter encounter on 08/07/17   XR WRIST LT AP/LAT/OBL MIN 3V   Narrative EXAM: XR WRIST LT AP/LAT/OBL MIN 3V    INDICATION:  trauma, TTP. Left wrist pain    COMPARISON: None. FINDINGS: 4  views of the left wrist demonstrate no fracture or other acute  osseous or articular abnormality. The soft tissues are within normal limits. Impression IMPRESSION:  No acute abnormality. Xr Wrist Lt Ap/lat/obl Min 3v    Result Date: 8/7/2017  EXAM: XR WRIST LT AP/LAT/OBL MIN 3V INDICATION:  trauma, TTP. Left wrist pain COMPARISON: None. FINDINGS: 4  views of the left wrist demonstrate no fracture or other acute osseous or articular abnormality. The soft tissues are within normal limits. IMPRESSION:  No acute abnormality.               MEDICATIONS       ALL MEDICATIONS  Current Facility-Administered Medications   Medication Dose Route Frequency    ziprasidone (GEODON) 20 mg in sterile water (preservative free) 1 mL injection  20 mg IntraMUSCular BID PRN    OLANZapine (ZyPREXA) tablet 5 mg  5 mg Oral Q6H PRN    benztropine (COGENTIN) tablet 2 mg  2 mg Oral BID PRN    benztropine (COGENTIN) injection 2 mg  2 mg IntraMUSCular BID PRN    LORazepam (ATIVAN) injection 2 mg  2 mg IntraMUSCular Q4H PRN    LORazepam (ATIVAN) tablet 1 mg  1 mg Oral Q4H PRN    zolpidem (AMBIEN) tablet 10 mg  10 mg Oral QHS PRN    acetaminophen (TYLENOL) tablet 650 mg  650 mg Oral Q4H PRN    ibuprofen (MOTRIN) tablet 400 mg  400 mg Oral Q8H PRN    magnesium hydroxide (MILK OF MAGNESIA) 400 mg/5 mL oral suspension 30 mL  30 mL Oral DAILY PRN    nicotine (NICODERM CQ) 21 mg/24 hr patch 1 Patch  1 Patch TransDERmal DAILY PRN      SCHEDULED MEDICATIONS  Current Facility-Administered Medications   Medication Dose Route Frequency                ASSESSMENT & PLAN        The patient, Chacha Koehler, is a 50 y.o.  female who presents at this time for treatment of the following diagnoses:  Patient C/[facility] [address] Problem List:   Schizoaffective disorder, mixed type ([facility] [address] (7/20/2011)    Assessment: likely, though, as above, I have never seen pt floridly psychotic or manic. Pt could poss just be a very poor functioning BPD pt    Plan: cont PTA meds   Borderline personality disorder (7/13/2010)    Assessment: severe. Low risk of s/i or h/i IMO.      Plan: limit setting, brief stay, DBT   Morbid obesity ([facility] [address] (7/20/2011)    Assessment: severe, related to polypharmacy    Plan: reduce psych meds on o/p basis   Malingering (8/8/2017)    Assessment: recurrent, uses the hospital and medical system in general, has very primitive psychological defenses and poor coping skills, acts out easily    Plan: limit setting   Essential hypertension (8/8/2017)    Assessment: stable    Plan: cont PTA meds   Acquired hypothyroidism (8/8/2017)    Assessment: stable    Plan: cont PTA meds               PROVISIONAL & DISCHARGE DIAGNOSES:    Problem List  Date Reviewed: 1/1/2012          Codes Class    Malingering ICD-10-CM: Z76.5  ICD-9-CM: V65.2         Essential hypertension ICD-10-CM: I10  ICD-9-CM: 401.9         Acquired hypothyroidism ICD-10-CM: E03.9  ICD-9-CM: 244.9         Morbid obesity ([facility] [address] ICD-10-CM: E66.01  ICD-9-CM: 278.01         * (Principal)Schizoaffective disorder, mixed type ([facility] [address] ICD-10-CM: F25.8  ICD-9-CM: 295.70         Borderline personality disorder ICD-10-CM: F60.3  ICD-9-CM: 301.83               Active Hospital Problems    Malingering      Essential hypertension      Acquired hypothyroidism      *Schizoaffective disorder, mixed type ([facility] [address]      Morbid obesity ([facility] [address]      Borderline personality disorder        DISCHARGE DIAGNOSIS:   Axis I:  SEE ABOVE  Axis II: SEE ABOVE  Axis III: SEE ABOVE  Axis IV:  lack of structure  Axis V:  50 on admission, 60 on discharge (baseline)         A coordinated, multidisplinary treatment team (includes the nurse, unit pharmcist,  and writer) round was conducted for this initial evaluation with the patient present. The following regarding medications was addressed during rounds with patient:   the risks and benefits of the proposed medication. The patient was given the opportunity to ask questions. Informed consent given to the use of the above medications. I have reviewed admission (and previous/old) labs and medical tests in the EHR and or transferring hospital documents. I will continue to order blood tests/labs and diagnostic tests as deemed appropriate and review results as they become available (see orders for details). I have reviewed old psychiatric and medical records available in the EHR.    ESTIMATED LENGTH OF STAY:    1 day , longer adm will most likely result in regression, pt is currently at her baseline. Psych meds are not of sig benefit to pt given the nature of pyschopathology. STRENGTHS:  Exercising self-direction/Resourceful and Access to housing/residential stability                 DISPOSITION:    Group Home. Patient to f/u with psychiatric/psychotherapy appointments. Pt to f/u with internist as directed. FOLLOW-UP CARE:    Activity as tolerated  Resume previous diet  Wound Care: none needed  Follow-up Information     Follow up With Details Comments Bird LANGLEY MD   [address] [place] [address]  [phone]                   PROGNOSIS:  Greatly dependent upon patient's ability to f/u with DBT/psychiatric/psychotherapy appointments. DISCHARGE MEDICATIONS: (no changes made). Informed consent given for the use of following psychotropic medications.   Current Discharge Medication List      CONTINUE these medications which have NOT CHANGED    Details   oxybutynin chloride XL (DITROPAN XL) 5 mg CR tablet Take 5 mg by mouth two (2) times a day. omeprazole (PRILOSEC) 20 mg capsule Take 20 mg by mouth nightly. allopurinol (ZYLOPRIM) 100 mg tablet Take 200 mg by mouth daily. atorvastatin (LIPITOR) 40 mg tablet Take 40 mg by mouth nightly. prazosin (MINIPRESS) 1 mg capsule Take 2 mg by mouth nightly. albuterol (PROVENTIL HFA, VENTOLIN HFA, PROAIR HFA) 90 mcg/actuation inhaler Take 1-2 Puffs by inhalation every four (4) hours as needed for Wheezing. FLUoxetine (PROZAC) 20 mg capsule Take 20 mg by mouth daily. !! QUEtiapine (SEROQUEL) 100 mg tablet Take 50 mg by mouth every morning.      !! QUEtiapine (SEROQUEL) 100 mg tablet Take 200 mg by mouth nightly. phenytoin ER (DILANTIN ER) 100 mg ER capsule Take 200 mg by mouth two (2) times a day. levETIRAcetam (KEPPRA) 750 mg tablet Take 750 mg by mouth two (2) times a day. levothyroxine (SYNTHROID) 50 mcg tablet Take 1 Tab by mouth Daily (before breakfast). Qty: 30 Tab, Refills: 0       !! - Potential duplicate medications found. Please discuss with provider.                                        SIGNED:    Merlinda Lamas, MD  8/8/2017

## 2017-08-08 NOTE — PROGRESS NOTES
[facility] Medication Reconciliation     Recommendations/Findings:   1) Removed benztropine, clotrimazole, hydroxyzine, indomethacin, risperidone, Risperdal Consta, senna, & temazepam.  2) Changed oxybutinin & changed pantoprazole to omeprazole. 3) Added allopurinol, atorvastatin, prazosin, albuterol HFA, fluoxetine, & quetiapine. Total Time Spent: 20 min    Information obtained from: transfer papers from [facility]    Past Medical History/Disease States:  Past Medical History:   Diagnosis Date    Acquired hypothyroidism 8/8/2017    Anxiety disorder     Asthma     COPD     uses 2 LPM O2 at home    Depression     Depression     Diabetes ([facility] [address]     Homicide attempt     Hypertension     Hypothyroidism     hypothyroid    [facility]     Psychotic disorder     Seizures ([facility] [address]     Suicidal behavior     schizoaffective    Suicidal thoughts          Patient allergies: Allergies as of 08/07/2017 - Review Complete 08/07/2017   Allergen Reaction Noted    Codeine Swelling 04/11/2010    Penicillins Swelling 04/11/2010    Flexeril [cyclobenzaprine] Other (comments) 04/11/2010    Mushroom combination no.1 Nausea and Vomiting 04/13/2011    Mustard Diarrhea and Nausea and Vomiting 07/29/2015    Neurontin [gabapentin] Other (comments) 04/11/2010    Peppermint Diarrhea and Nausea and Vomiting 07/29/2015    Trazodone Other (comments) 04/11/2010         Prior to Admission Medications   Prescriptions Last Dose Informant Patient Reported? Taking? FLUoxetine (PROZAC) 20 mg capsule  Transfer Papers Yes Yes   Sig: Take 20 mg by mouth daily. QUEtiapine (SEROQUEL) 100 mg tablet  Transfer Papers Yes Yes   Sig: Take 100 mg by mouth every morning. QUEtiapine (SEROQUEL) 100 mg tablet  Transfer Papers Yes Yes   Sig: Take 200 mg by mouth nightly.    albuterol (PROVENTIL HFA, VENTOLIN HFA, PROAIR HFA) 90 mcg/actuation inhaler  Transfer Papers Yes Yes   Sig: Take 1-2 Puffs by inhalation every four (4) hours as needed for Wheezing. allopurinol (ZYLOPRIM) 100 mg tablet  Transfer Papers Yes Yes   Sig: Take 200 mg by mouth daily. atorvastatin (LIPITOR) 40 mg tablet  Transfer Papers Yes Yes   Sig: Take 40 mg by mouth nightly. levETIRAcetam (KEPPRA) 750 mg tablet  Transfer Papers Yes Yes   Sig: Take 750 mg by mouth two (2) times a day. levothyroxine (SYNTHROID) 50 mcg tablet  Transfer Papers No Yes   Sig: Take 1 Tab by mouth Daily (before breakfast). omeprazole (PRILOSEC) 20 mg capsule  Transfer Papers Yes Yes   Sig: Take 20 mg by mouth nightly. oxybutynin chloride XL (DITROPAN XL) 5 mg CR tablet  Transfer Papers Yes Yes   Sig: Take 5 mg by mouth two (2) times a day. phenytoin ER (DILANTIN ER) 100 mg ER capsule  Transfer Papers Yes Yes   Sig: Take 200 mg by mouth two (2) times a day. prazosin (MINIPRESS) 1 mg capsule  Transfer Papers Yes Yes   Sig: Take 2 mg by mouth nightly.       Facility-Administered Medications: None            Thank you,  Teofilo Dennis, PHARMD, BCPS  Contact: [phone]

## 2017-08-09 LAB
BACTERIA SPEC CULT: ABNORMAL
BACTERIA SPEC CULT: ABNORMAL
SERVICE CMNT-IMP: ABNORMAL

## 2019-01-01 ENCOUNTER — IP HISTORICAL/CONVERTED ENCOUNTER (OUTPATIENT)
Dept: OTHER | Age: 54
End: 2019-01-01

## 2019-01-01 ENCOUNTER — HOSPITAL ENCOUNTER (EMERGENCY)
Age: 54
Discharge: HOME | End: 2019-09-30
Attending: EMERGENCY MEDICINE
Payer: MEDICARE

## 2019-01-01 VITALS
HEART RATE: 83 BPM | TEMPERATURE: 98 F | RESPIRATION RATE: 18 BRPM | SYSTOLIC BLOOD PRESSURE: 133 MMHG | DIASTOLIC BLOOD PRESSURE: 82 MMHG | OXYGEN SATURATION: 98 %

## 2019-01-01 DIAGNOSIS — Z76.5 MALINGERING: Primary | ICD-10-CM

## 2019-01-01 DIAGNOSIS — F60.3 BORDERLINE PERSONALITY DISORDER (HCC): ICD-10-CM

## 2019-01-01 DIAGNOSIS — F25.0 SCHIZOAFFECTIVE DISORDER, MIXED TYPE (HCC): ICD-10-CM

## 2019-01-01 PROCEDURE — 99283 EMERGENCY DEPT VISIT LOW MDM: CPT

## 2019-03-01 ENCOUNTER — IP HISTORICAL/CONVERTED ENCOUNTER (OUTPATIENT)
Dept: OTHER | Age: 54
End: 2019-03-01

## 2019-09-30 NOTE — ED PROVIDER NOTES
48 y.o. female with past medical history significant for Asthma, HTN, Seizures, Hypothyroidism, SI, DM, Depression, and Anxiety who presents from [facility] via police with chief complaint of Suicidal Ideation. There are no other acute medical concerns at this time. She has a history of schizoaffective disorder, suicidal ideations and hallucinations presents to emergency department claiming to be suicidal. The patient has no concrete plan. The patient denies any other symptoms such as a headache, chest pain, shortness breath, abdominal pain, fever chills, or weakness. The patient denies having any concrete plans. Social hx: Former tobacco use. Current EtOH use (5 drinks/week). PCP: Yeni Mccloud MD 
 
 
 
The history is provided by the patient. Past Medical History:  
Diagnosis Date  Acquired hypothyroidism 2017  Anxiety disorder  Asthma  COPD   
 uses 2 LPM O2 at home  Depression  Depression  Diabetes ([facility] [address]  Homicide attempt  Hypertension  Hypothyroidism   
 hypothyroid  Mood disorder ([facility] [address]  Psychotic disorder ([address]  Seizures ([facility] [address]  Suicidal behavior   
 schizoaffective  Suicidal thoughts Past Surgical History:  
Procedure Laterality Date  HX APPENDECTOMY  HX  SECTION    
 HX CHOLECYSTECTOMY  HX HEENT    
 throat surgery (after a dog bite) History reviewed. No pertinent family history. Social History Socioeconomic History  Marital status: SINGLE Spouse name: Not on file  Number of children: Not on file  Years of education: Not on file  Highest education level: Not on file Occupational History  Not on file Social Needs  Financial resource strain: Not on file  Food insecurity:  
  Worry: Not on file Inability: Not on file  Transportation needs:  
  Medical: Not on file Non-medical: Not on file Tobacco Use  Smoking status: Former Smoker  Smokeless tobacco: Never Used Substance and Sexual Activity  Alcohol use: Yes Alcohol/week: 5.0 standard drinks Types: 6 Cans of beer per week  Drug use: No  
 Sexual activity: Never Lifestyle  Physical activity:  
  Days per week: Not on file Minutes per session: Not on file  Stress: Not on file Relationships  Social connections:  
  Talks on phone: Not on file Gets together: Not on file Attends Anglican service: Not on file Active member of club or organization: Not on file Attends meetings of clubs or organizations: Not on file Relationship status: Not on file  Intimate partner violence:  
  Fear of current or ex partner: Not on file Emotionally abused: Not on file Physically abused: Not on file Forced sexual activity: Not on file Other Topics Concern  Not on file Social History Narrative The patient is single. The patient lives with a caregiver in a group home. Pt has had multiple group home placements, chronically dissatisfied with placements. The patient has one child but, gave this child up for adoption at birth . The patient does not have legal issues pending. The patient's source of income comes from social security. Episcopalian and cultural practices have been noted and include: Advent. 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 The patient has not been a victim of sexual/physical abuse. The highest grade achieved is some college. ALLERGIES: Codeine; Penicillins; Flexeril [cyclobenzaprine]; Mushroom combination no.1; Mustard; Neurontin [gabapentin]; Peppermint; and Trazodone Review of Systems Constitutional: Negative for chills, diaphoresis and fever. HENT: Negative for congestion, postnasal drip, rhinorrhea and sore throat. Eyes: Negative for photophobia, discharge, redness and visual disturbance. Respiratory: Negative for cough, chest tightness, shortness of breath and wheezing. Cardiovascular: Negative for chest pain, palpitations and leg swelling. Gastrointestinal: Negative for abdominal distention, abdominal pain, blood in stool, constipation, diarrhea, nausea and vomiting. Genitourinary: Negative for difficulty urinating, dysuria, frequency, hematuria and urgency. Musculoskeletal: Negative for arthralgias, back pain, joint swelling and myalgias. Skin: Negative for color change and rash. Neurological: Negative for dizziness, speech difficulty, weakness, light-headedness, numbness and headaches. Psychiatric/Behavioral: Positive for suicidal ideas. Negative for confusion. The patient is not nervous/anxious. All other systems reviewed and are negative. Vitals:  
 09/30/19 1314 BP: 133/82 Pulse: 83 Resp: 18 Temp: 98 °F (36.7 °C) SpO2: 98% Physical Exam  
Constitutional: She is oriented to person, place, and time. She appears well-developed and well-nourished. No distress. HENT:  
Head: Normocephalic and atraumatic. Right Ear: External ear normal.  
Left Ear: External ear normal.  
Nose: Nose normal.  
Mouth/Throat: Oropharynx is clear and moist.  
Eyes: Pupils are equal, round, and reactive to light. Conjunctivae and EOM are normal. No scleral icterus. Neck: Normal range of motion. Neck supple. No JVD present. No tracheal deviation present. No thyromegaly present. Cardiovascular: Normal rate, regular rhythm and normal heart sounds. Exam reveals no gallop and no friction rub. No murmur heard. Pulmonary/Chest: Effort normal and breath sounds normal. No respiratory distress. She has no wheezes. She has no rales. She exhibits no tenderness. Abdominal: Soft.  Bowel sounds are normal. She exhibits no distension and no mass. There is no tenderness. There is no rebound and no guarding. Musculoskeletal: Normal range of motion. She exhibits no edema or tenderness. Lymphadenopathy:  
  She has no cervical adenopathy. Neurological: She is alert and oriented to person, place, and time. She has normal strength. She displays no atrophy and no tremor. No cranial nerve deficit. She exhibits normal muscle tone. Coordination and gait normal.  
Skin: Skin is warm and dry. No rash noted. She is not diaphoretic. No erythema. Psychiatric: She has a normal mood and affect. Her behavior is normal.  
Suicidal ideations however states if she had something to eat she would not be suicidal  
Nursing note and vitals reviewed. MDM Number of Diagnoses or Management Options Diagnosis management comments: The patient[age] female with a long-standing history of psychiatric illness, presenting to the emergency department suicidal ideations. She'll be seen by behavioral health and after evaluating will decide on disposition. The patient has been evaluated by behavioral health and deemed acceptable to discharge, she has agreed to a contract and she will be picked up by the folks at the home where she resides. Procedures

## 2019-09-30 NOTE — DISCHARGE INSTRUCTIONS
Continue your routine medications after discharge from the Emergency Department    Patient Education        Learning About Borderline Personality Disorder  What is borderline personality disorder? Borderline personality disorder is a mental health condition. It causes intense mood swings, impulsive behaviors, and severe problems with self-worth. It can lead to troubled relationships in every area of your life. Experts don't know exactly what causes the disorder. It may be linked to a problem with the parts of the brain that control reactions to emotions. The disorder also seems to run in families. Often, people who get it faced some kind of childhood trauma such as abuse, neglect, or the death of a parent. Most of the time, signs of the disorder first appear in childhood. But problems often don't start until the early adult years. It's important to know that the disorder can be treated. Treatment can be hard, and getting better can take years. But with treatment, most people with borderline personality disorder do get better over time. What are the symptoms? Everyone has problems with emotions or behaviors sometimes. But if you have borderline personality disorder, the problems are severe. They repeat over a long time and disrupt your life. The most common symptoms include:  · Intense emotions and mood swings. · Harmful, impulsive behaviors. These may include substance use, binge eating, out-of-control spending, risky sexual behavior, and reckless driving. · Hurting yourself. This may include cutting or burning yourself or attempting suicide. · Trouble with relationships. You may see others as either \"good\" or \"bad. \" And your view may suddenly shift from one to the other, for minor reasons. · Feeling worthless or empty inside. · A frantic fear of being left alone (abandoned). This fear may lead to desperate attempts to hold on to those around you.  Or it may cause you to reject others before they can reject you.  · Problems with anger. These may include violent temper tantrums and aggressive behavior. How is it treated? It may seem that there is no one on your side. You might have been told that there is no hope. But just because you've heard this, that doesn't mean it's true. Getting medical treatment and taking care of yourself can really help. Medical treatment  When you start treatment, you will find health professionals who will help you. You will also meet others who have gone through what you are going through. Long-term treatment can reduce symptoms and harmful behaviors. It can also help you manage your emotions better. Treatment may include:  · Counseling and therapy. It's important to find a counselor you can build a stable relationship with. This can be hard. Your condition may cause you to see your counselor as caring one minute and cruel the next. This can happen especially when he or she asks you to try to change a behavior. Try to find a counselor who has special training in dialectical behavioral therapy. It's a type of therapy that is often used to treat people with this disorder. · Medicines. Examples are antidepressants, mood stabilizers, and antipsychotics. They may be helpful when you use them along with therapy. Self-care  There are things you can do to help yourself. Here are some tips:  · Keep a regular daily schedule. · Do not drink alcohol or use drugs. If your doctor prescribed medicines for you, take them exactly as directed. · Get a healthy amount of sleep. Try to be active during daylight hours, and go to sleep and wake up at the same time each day. · Eat healthy foods like fruits and vegetables; whole-grain breads and cereals; and lean meats, fish, and poultry. · Practice mindfulness or other meditation. To be mindful means to pay attention to and accept the things that are happening right now, in the present moment. · Keep to your treatment plan, even when it's hard.   Keep the numbers for these national suicide hotlines: 4-605-067-TALK (8-283.841.3505) and 4-372-NSSFEQM (2-675.411.4896). If you or someone you know talks about suicide or about feeling hopeless, get help right away. When should you call for help? Call 911 anytime you think you may need emergency care. For example, call if:  · You feel you cannot stop from hurting yourself or someone else. Call your doctor now or seek immediate medical care if:  · You feel much more depressed. · You hear voices. Watch closely for changes in your health, and be sure to contact your doctor if:  · You have a new crisis you can't handle. Follow-up care is a key part of your treatment and safety. Be sure to make and go to all appointments, and call your doctor if you are having problems. It's also a good idea to know your test results and keep a list of the medicines you take. Where can you learn more? Go to http://xavi-octaviano.info/. Enter B236 in the search box to learn more about \"Learning About Borderline Personality Disorder. \"  Current as of: May 28, 2019  Content Version: 12.2  © 9361-9875 [employer]. Care instructions adapted under license by [employer] (which disclaims liability or warranty for this information). If you have questions about a medical condition or this instruction, always ask your healthcare professional. [place] [address] any warranty or liability for your use of this information. Patient Education        Learning About Schizoaffective Disorder  What is schizoaffective disorder? Schizoaffective (say \"pvnl-[id number]-et-FECK-tiv\") disorder is a complex mental illness. People who have it have the symptoms of both schizophrenia and a mood disorder. The disorder affects how clearly you can think. It can also make it hard to manage your emotions and connect with others. And it affects how happy or sad you feel. What causes it?   Experts don't know what causes schizoaffective disorder. It may have different causes for different people. It's not caused by anything you did or how your parents raised you. And it's not a sign of weakness. What are the symptoms? The symptoms of schizoaffective disorder are the same as those of schizophrenia and a mood disorder. People with schizoaffective disorder may have many of these symptoms. Schizophrenia symptoms include:  · Having hallucinations. This means that you see or hear things that aren't really there. · Having delusions. These are beliefs that aren't real.  · Having a hard time feeling and showing emotion. Mood disorder symptoms include:  · Depression. · Feeling extremely happy or having lots of energy. How is it diagnosed? Your doctor or mental health professional usually can tell if you have schizoaffective disorder by talking with you. He or she will look at the order and timing of your symptoms and how long your symptoms last.  Your doctor will ask you about other things too. These may include questions about:  · Any odd experiences you may have had, such as hearing voices or having confusing thoughts. · Your feelings. · Any changes in eating habits, energy level, and interest in daily tasks. · How well you are sleeping. · If you can focus on the things you do. How is it treated? Finding out that you have schizoaffective disorder can be scary and hard to deal with. But the disorder can be treated. The goal of treatment is to lower your stress and help your brain work as it should. Ongoing treatment can keep the disorder under control. Treatment includes medicines and counseling. Medicines help your symptoms. It's important to take your medicines on schedule to keep your moods even. When you feel good, you may think that you don't need them. But it is important to keep taking them. Counseling helps you change how you think about things. It can also help you cope with the illness.  You will work with a mental health professional. This may be a psychologist, a licensed professional counselor, a clinical , or a psychiatrist.  Follow-up care is a key part of your treatment and safety. Be sure to make and go to all appointments, and call your doctor if you are having problems. It's also a good idea to know your test results and keep a list of the medicines you take. Where can you learn more? Go to http://xavi-octaviano.info/. Enter A016 in the search box to learn more about \"Learning About Schizoaffective Disorder. \"  Current as of: May 28, 2019  Content Version: 12.2  © 9230-4364 [employer]. Care instructions adapted under license by [employer] (which disclaims liability or warranty for this information). If you have questions about a medical condition or this instruction, always ask your healthcare professional. [address] any warranty or liability for your use of this information.

## 2019-09-30 NOTE — ED NOTES
Pt states her left side hurts after arriving in room, denies injury but states \"maybe I broke some ribs\" Pt agrees to contract to safety while in ED. Cords removed from room

## 2019-09-30 NOTE — ED TRIAGE NOTES
Patient comes to the ER escorted by police from [address] [facility]. Patient reports, \"I want to see my mommy, my mommy  when I was 18\". Denies plan. +SI
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
## 2019-09-30 NOTE — ED NOTES
Per [facility] wait on labs until they speak to group home for possible DC

## 2020-01-01 ENCOUNTER — IP HISTORICAL/CONVERTED ENCOUNTER (OUTPATIENT)
Dept: OTHER | Age: 55
End: 2020-01-01